# Patient Record
Sex: MALE | Race: WHITE | Employment: OTHER | URBAN - METROPOLITAN AREA
[De-identification: names, ages, dates, MRNs, and addresses within clinical notes are randomized per-mention and may not be internally consistent; named-entity substitution may affect disease eponyms.]

---

## 2017-08-31 DIAGNOSIS — I50.22 CHRONIC SYSTOLIC HEART FAILURE (H): Primary | ICD-10-CM

## 2017-08-31 LAB
ANION GAP SERPL CALCULATED.3IONS-SCNC: 6 MMOL/L (ref 3–14)
BUN SERPL-MCNC: 19 MG/DL (ref 7–30)
CALCIUM SERPL-MCNC: 8.6 MG/DL (ref 8.5–10.1)
CHLORIDE SERPL-SCNC: 105 MMOL/L (ref 94–109)
CO2 SERPL-SCNC: 28 MMOL/L (ref 20–32)
CREAT SERPL-MCNC: 0.84 MG/DL (ref 0.66–1.25)
GFR SERPL CREATININE-BSD FRML MDRD: 90 ML/MIN/1.7M2
GLUCOSE SERPL-MCNC: 96 MG/DL (ref 70–99)
POTASSIUM SERPL-SCNC: 4.4 MMOL/L (ref 3.4–5.3)
SODIUM SERPL-SCNC: 139 MMOL/L (ref 133–144)

## 2017-08-31 PROCEDURE — 80048 BASIC METABOLIC PNL TOTAL CA: CPT | Performed by: NURSE PRACTITIONER

## 2017-08-31 PROCEDURE — 36415 COLL VENOUS BLD VENIPUNCTURE: CPT | Performed by: NURSE PRACTITIONER

## 2017-09-19 ENCOUNTER — TRANSFERRED RECORDS (OUTPATIENT)
Dept: HEALTH INFORMATION MANAGEMENT | Facility: CLINIC | Age: 67
End: 2017-09-19

## 2020-02-23 ENCOUNTER — APPOINTMENT (OUTPATIENT)
Dept: GENERAL RADIOLOGY | Facility: CLINIC | Age: 70
DRG: 389 | End: 2020-02-23
Attending: PEDIATRICS
Payer: COMMERCIAL

## 2020-02-23 ENCOUNTER — HOSPITAL ENCOUNTER (INPATIENT)
Facility: CLINIC | Age: 70
LOS: 1 days | Discharge: HOME | DRG: 389 | End: 2020-02-24
Attending: FAMILY MEDICINE | Admitting: PEDIATRICS
Payer: COMMERCIAL

## 2020-02-23 ENCOUNTER — APPOINTMENT (OUTPATIENT)
Dept: GENERAL RADIOLOGY | Facility: CLINIC | Age: 70
DRG: 389 | End: 2020-02-23
Attending: FAMILY MEDICINE
Payer: COMMERCIAL

## 2020-02-23 ENCOUNTER — APPOINTMENT (OUTPATIENT)
Dept: CT IMAGING | Facility: CLINIC | Age: 70
DRG: 389 | End: 2020-02-23
Attending: FAMILY MEDICINE
Payer: COMMERCIAL

## 2020-02-23 DIAGNOSIS — R10.84 ABDOMINAL PAIN, GENERALIZED: ICD-10-CM

## 2020-02-23 DIAGNOSIS — K56.609 SMALL BOWEL OBSTRUCTION (H): ICD-10-CM

## 2020-02-23 PROBLEM — C91.10 CLL (CHRONIC LYMPHOCYTIC LEUKEMIA) (H): Status: ACTIVE | Noted: 2020-02-23

## 2020-02-23 PROBLEM — Z85.46 PERSONAL HISTORY OF MALIGNANT NEOPLASM OF PROSTATE: Status: ACTIVE | Noted: 2020-02-23

## 2020-02-23 PROBLEM — D72.828 NEUTROPHILIC LEUKOCYTOSIS: Status: ACTIVE | Noted: 2020-02-23

## 2020-02-23 PROBLEM — I42.8 NONISCHEMIC CARDIOMYOPATHY (H): Status: ACTIVE | Noted: 2020-02-23

## 2020-02-23 PROBLEM — R06.09 DYSPNEA ON EXERTION: Status: ACTIVE | Noted: 2020-02-23

## 2020-02-23 PROBLEM — K40.20 BILATERAL INGUINAL HERNIA: Status: ACTIVE | Noted: 2020-02-23

## 2020-02-23 PROBLEM — I51.89 DIASTOLIC DYSFUNCTION: Status: ACTIVE | Noted: 2020-02-23

## 2020-02-23 LAB
ALBUMIN SERPL-MCNC: 3.8 G/DL (ref 3.4–5)
ALP SERPL-CCNC: 88 U/L (ref 40–150)
ALT SERPL W P-5'-P-CCNC: 38 U/L (ref 0–70)
ANION GAP SERPL CALCULATED.3IONS-SCNC: 6 MMOL/L (ref 3–14)
AST SERPL W P-5'-P-CCNC: 22 U/L (ref 0–45)
BASOPHILS # BLD AUTO: 0.1 10E9/L (ref 0–0.2)
BASOPHILS NFR BLD AUTO: 0.3 %
BILIRUB SERPL-MCNC: 0.5 MG/DL (ref 0.2–1.3)
BUN SERPL-MCNC: 20 MG/DL (ref 7–30)
CALCIUM SERPL-MCNC: 8.9 MG/DL (ref 8.5–10.1)
CHLORIDE SERPL-SCNC: 103 MMOL/L (ref 94–109)
CO2 SERPL-SCNC: 30 MMOL/L (ref 20–32)
CREAT SERPL-MCNC: 0.77 MG/DL (ref 0.66–1.25)
DIFFERENTIAL METHOD BLD: ABNORMAL
EOSINOPHIL NFR BLD AUTO: 0 %
ERYTHROCYTE [DISTWIDTH] IN BLOOD BY AUTOMATED COUNT: 13 % (ref 10–15)
GFR SERPL CREATININE-BSD FRML MDRD: >90 ML/MIN/{1.73_M2}
GLUCOSE SERPL-MCNC: 144 MG/DL (ref 70–99)
HCT VFR BLD AUTO: 48.1 % (ref 40–53)
HGB BLD-MCNC: 16.3 G/DL (ref 13.3–17.7)
IMM GRANULOCYTES # BLD: 0.1 10E9/L (ref 0–0.4)
IMM GRANULOCYTES NFR BLD: 0.4 %
LIPASE SERPL-CCNC: 70 U/L (ref 73–393)
LYMPHOCYTES # BLD AUTO: 5.3 10E9/L (ref 0.8–5.3)
LYMPHOCYTES NFR BLD AUTO: 33.4 %
MCH RBC QN AUTO: 30.8 PG (ref 26.5–33)
MCHC RBC AUTO-ENTMCNC: 33.9 G/DL (ref 31.5–36.5)
MCV RBC AUTO: 91 FL (ref 78–100)
MONOCYTES # BLD AUTO: 0.4 10E9/L (ref 0–1.3)
MONOCYTES NFR BLD AUTO: 2.7 %
NEUTROPHILS # BLD AUTO: 10.1 10E9/L (ref 1.6–8.3)
NEUTROPHILS NFR BLD AUTO: 63.2 %
NRBC # BLD AUTO: 0 10*3/UL
NRBC BLD AUTO-RTO: 0 /100
PLATELET # BLD AUTO: 309 10E9/L (ref 150–450)
PLATELET # BLD EST: ABNORMAL 10*3/UL
POTASSIUM SERPL-SCNC: 4.2 MMOL/L (ref 3.4–5.3)
PROT SERPL-MCNC: 7 G/DL (ref 6.8–8.8)
RBC # BLD AUTO: 5.29 10E12/L (ref 4.4–5.9)
RBC MORPH BLD: NORMAL
SODIUM SERPL-SCNC: 139 MMOL/L (ref 133–144)
TROPONIN I SERPL-MCNC: <0.015 UG/L (ref 0–0.04)
WBC # BLD AUTO: 15.9 10E9/L (ref 4–11)

## 2020-02-23 PROCEDURE — 84484 ASSAY OF TROPONIN QUANT: CPT | Performed by: FAMILY MEDICINE

## 2020-02-23 PROCEDURE — 96374 THER/PROPH/DIAG INJ IV PUSH: CPT | Performed by: FAMILY MEDICINE

## 2020-02-23 PROCEDURE — 12000000 ZZH R&B MED SURG/OB

## 2020-02-23 PROCEDURE — 83690 ASSAY OF LIPASE: CPT | Performed by: FAMILY MEDICINE

## 2020-02-23 PROCEDURE — 74176 CT ABD & PELVIS W/O CONTRAST: CPT

## 2020-02-23 PROCEDURE — 25800030 ZZH RX IP 258 OP 636: Performed by: PEDIATRICS

## 2020-02-23 PROCEDURE — 25000128 H RX IP 250 OP 636

## 2020-02-23 PROCEDURE — 25000128 H RX IP 250 OP 636: Performed by: FAMILY MEDICINE

## 2020-02-23 PROCEDURE — 80053 COMPREHEN METABOLIC PANEL: CPT | Performed by: FAMILY MEDICINE

## 2020-02-23 PROCEDURE — 25000128 H RX IP 250 OP 636: Performed by: PEDIATRICS

## 2020-02-23 PROCEDURE — 96375 TX/PRO/DX INJ NEW DRUG ADDON: CPT | Performed by: FAMILY MEDICINE

## 2020-02-23 PROCEDURE — 96361 HYDRATE IV INFUSION ADD-ON: CPT | Performed by: FAMILY MEDICINE

## 2020-02-23 PROCEDURE — 85025 COMPLETE CBC W/AUTO DIFF WBC: CPT | Performed by: FAMILY MEDICINE

## 2020-02-23 PROCEDURE — 99285 EMERGENCY DEPT VISIT HI MDM: CPT | Mod: 25 | Performed by: FAMILY MEDICINE

## 2020-02-23 PROCEDURE — 40000986 XR CHEST PORT 1 VW

## 2020-02-23 PROCEDURE — 99223 1ST HOSP IP/OBS HIGH 75: CPT | Mod: AI | Performed by: PEDIATRICS

## 2020-02-23 PROCEDURE — 25000132 ZZH RX MED GY IP 250 OP 250 PS 637: Performed by: PEDIATRICS

## 2020-02-23 PROCEDURE — 25800030 ZZH RX IP 258 OP 636: Performed by: FAMILY MEDICINE

## 2020-02-23 PROCEDURE — 71101 X-RAY EXAM UNILAT RIBS/CHEST: CPT | Mod: TC,LT

## 2020-02-23 PROCEDURE — 99285 EMERGENCY DEPT VISIT HI MDM: CPT | Mod: Z6 | Performed by: FAMILY MEDICINE

## 2020-02-23 RX ORDER — CYCLOBENZAPRINE HCL 10 MG
10 TABLET ORAL 3 TIMES DAILY PRN
COMMUNITY

## 2020-02-23 RX ORDER — ASCORBIC ACID 500 MG
1000 TABLET ORAL DAILY
COMMUNITY

## 2020-02-23 RX ORDER — ONDANSETRON 2 MG/ML
INJECTION INTRAMUSCULAR; INTRAVENOUS
Status: COMPLETED
Start: 2020-02-23 | End: 2020-02-23

## 2020-02-23 RX ORDER — LORAZEPAM 0.5 MG/1
0.5 TABLET ORAL EVERY 4 HOURS PRN
Status: DISCONTINUED | OUTPATIENT
Start: 2020-02-23 | End: 2020-02-24 | Stop reason: HOSPADM

## 2020-02-23 RX ORDER — HYDROMORPHONE HYDROCHLORIDE 1 MG/ML
.3-.5 INJECTION, SOLUTION INTRAMUSCULAR; INTRAVENOUS; SUBCUTANEOUS
Status: DISCONTINUED | OUTPATIENT
Start: 2020-02-23 | End: 2020-02-24 | Stop reason: HOSPADM

## 2020-02-23 RX ORDER — HYDROMORPHONE HYDROCHLORIDE 1 MG/ML
0.5 INJECTION, SOLUTION INTRAMUSCULAR; INTRAVENOUS; SUBCUTANEOUS
Status: COMPLETED | OUTPATIENT
Start: 2020-02-23 | End: 2020-02-23

## 2020-02-23 RX ORDER — SODIUM CHLORIDE, SODIUM LACTATE, POTASSIUM CHLORIDE, CALCIUM CHLORIDE 600; 310; 30; 20 MG/100ML; MG/100ML; MG/100ML; MG/100ML
1000 INJECTION, SOLUTION INTRAVENOUS CONTINUOUS
Status: DISCONTINUED | OUTPATIENT
Start: 2020-02-23 | End: 2020-02-24

## 2020-02-23 RX ORDER — LORAZEPAM 2 MG/ML
0.5 INJECTION INTRAMUSCULAR EVERY 4 HOURS PRN
Status: DISCONTINUED | OUTPATIENT
Start: 2020-02-23 | End: 2020-02-24 | Stop reason: HOSPADM

## 2020-02-23 RX ORDER — PROCHLORPERAZINE MALEATE 5 MG
5 TABLET ORAL EVERY 6 HOURS PRN
Status: DISCONTINUED | OUTPATIENT
Start: 2020-02-23 | End: 2020-02-24 | Stop reason: HOSPADM

## 2020-02-23 RX ORDER — ACETAMINOPHEN 650 MG/1
650 SUPPOSITORY RECTAL EVERY 4 HOURS PRN
Status: DISCONTINUED | OUTPATIENT
Start: 2020-02-23 | End: 2020-02-24

## 2020-02-23 RX ORDER — ONDANSETRON 2 MG/ML
4 INJECTION INTRAMUSCULAR; INTRAVENOUS EVERY 6 HOURS PRN
Status: DISCONTINUED | OUTPATIENT
Start: 2020-02-23 | End: 2020-02-24 | Stop reason: HOSPADM

## 2020-02-23 RX ORDER — OMEGA-3/DHA/EPA/FISH OIL 200-300 MG
1000 CAPSULE ORAL DAILY
COMMUNITY

## 2020-02-23 RX ORDER — POLYETHYLENE GLYCOL 3350 17 G/17G
17 POWDER, FOR SOLUTION ORAL DAILY PRN
COMMUNITY

## 2020-02-23 RX ORDER — NALOXONE HYDROCHLORIDE 0.4 MG/ML
.1-.4 INJECTION, SOLUTION INTRAMUSCULAR; INTRAVENOUS; SUBCUTANEOUS
Status: DISCONTINUED | OUTPATIENT
Start: 2020-02-23 | End: 2020-02-24 | Stop reason: HOSPADM

## 2020-02-23 RX ORDER — PROCHLORPERAZINE 25 MG
12.5 SUPPOSITORY, RECTAL RECTAL EVERY 12 HOURS PRN
Status: DISCONTINUED | OUTPATIENT
Start: 2020-02-23 | End: 2020-02-24 | Stop reason: HOSPADM

## 2020-02-23 RX ORDER — ACETAMINOPHEN 325 MG/1
325-650 TABLET ORAL EVERY 6 HOURS PRN
COMMUNITY

## 2020-02-23 RX ORDER — ONDANSETRON 4 MG/1
4 TABLET, ORALLY DISINTEGRATING ORAL EVERY 6 HOURS PRN
Status: DISCONTINUED | OUTPATIENT
Start: 2020-02-23 | End: 2020-02-24 | Stop reason: HOSPADM

## 2020-02-23 RX ORDER — GABAPENTIN 100 MG/1
100 CAPSULE ORAL 3 TIMES DAILY PRN
COMMUNITY
End: 2020-08-07

## 2020-02-23 RX ORDER — ONDANSETRON 2 MG/ML
4 INJECTION INTRAMUSCULAR; INTRAVENOUS ONCE
Status: DISCONTINUED | OUTPATIENT
Start: 2020-02-23 | End: 2020-02-23

## 2020-02-23 RX ORDER — LIDOCAINE 40 MG/G
CREAM TOPICAL
Status: DISCONTINUED | OUTPATIENT
Start: 2020-02-23 | End: 2020-02-24 | Stop reason: HOSPADM

## 2020-02-23 RX ADMIN — Medication 1 LOZENGE: at 18:59

## 2020-02-23 RX ADMIN — HYDROMORPHONE HYDROCHLORIDE 0.5 MG: 1 INJECTION, SOLUTION INTRAMUSCULAR; INTRAVENOUS; SUBCUTANEOUS at 06:13

## 2020-02-23 RX ADMIN — HYDROMORPHONE HYDROCHLORIDE 0.5 MG: 1 INJECTION, SOLUTION INTRAMUSCULAR; INTRAVENOUS; SUBCUTANEOUS at 06:31

## 2020-02-23 RX ADMIN — Medication 1 LOZENGE: at 15:30

## 2020-02-23 RX ADMIN — SODIUM CHLORIDE, POTASSIUM CHLORIDE, SODIUM LACTATE AND CALCIUM CHLORIDE 1000 ML: 600; 310; 30; 20 INJECTION, SOLUTION INTRAVENOUS at 11:23

## 2020-02-23 RX ADMIN — Medication 1 LOZENGE: at 12:04

## 2020-02-23 RX ADMIN — ONDANSETRON 4 MG: 2 INJECTION INTRAMUSCULAR; INTRAVENOUS at 06:12

## 2020-02-23 RX ADMIN — Medication 1 LOZENGE: at 20:46

## 2020-02-23 RX ADMIN — ONDANSETRON 4 MG: 2 INJECTION INTRAMUSCULAR; INTRAVENOUS at 10:40

## 2020-02-23 RX ADMIN — HYDROMORPHONE HYDROCHLORIDE 0.5 MG: 1 INJECTION, SOLUTION INTRAMUSCULAR; INTRAVENOUS; SUBCUTANEOUS at 10:27

## 2020-02-23 RX ADMIN — SODIUM CHLORIDE 1000 ML: 9 INJECTION, SOLUTION INTRAVENOUS at 06:12

## 2020-02-23 RX ADMIN — SODIUM CHLORIDE, POTASSIUM CHLORIDE, SODIUM LACTATE AND CALCIUM CHLORIDE 1000 ML: 600; 310; 30; 20 INJECTION, SOLUTION INTRAVENOUS at 20:46

## 2020-02-23 ASSESSMENT — ACTIVITIES OF DAILY LIVING (ADL)
ADLS_ACUITY_SCORE: 11
RETIRED_EATING: 0-->INDEPENDENT
TRANSFERRING: 0-->INDEPENDENT
AMBULATION: 0-->INDEPENDENT
FALL_HISTORY_WITHIN_LAST_SIX_MONTHS: NO
BATHING: 0-->INDEPENDENT
TOILETING: 0-->INDEPENDENT
COGNITION: 0 - NO COGNITION ISSUES REPORTED
SWALLOWING: 0-->SWALLOWS FOODS/LIQUIDS WITHOUT DIFFICULTY
ADLS_ACUITY_SCORE: 13
ADLS_ACUITY_SCORE: 11
RETIRED_COMMUNICATION: 0-->UNDERSTANDS/COMMUNICATES WITHOUT DIFFICULTY
DRESS: 0-->INDEPENDENT

## 2020-02-23 ASSESSMENT — MIFFLIN-ST. JEOR: SCORE: 1815.4

## 2020-02-23 NOTE — PROGRESS NOTES
S-(situation): Patient arrives to room 268 via cart from ED    B-(background): SBO    A-(assessment): VSS. IV infusing TKO, awaiting for fluid orders. NG clamped awaiting orders. Pt up with SBA, will continue to monitor.     R-(recommendations): Orders reviewed with pt. Will monitor patient per MD orders.     Inpatient nursing criteria listed below were met:    Health care directives status obtained and documented: Yes  SCD's Documented: N/A  Skin issues/needs documented:Yes  Isolation needs addressed, if appropriate: NA  Fall Prevention: Care plan updated, Education given and documented Yes  MRSA swab completed for ICU admissions only: NA  Care Plan initiated: Yes  Education Assessment documented:Yes  Education Documented (Pre-existing chronic infection such as, MRSA/VRE need education on admission): Yes  Admission Medication Reconciliation completed: Yes  New medication patient education completed and documented (Possible Side Effects of Common Medications handout): Yes  Home medications if not able to send immediately home with family stored here: NA  Reminder note placed in discharge instructions: NA  Discharge planning review completed (admission navigator) Yes

## 2020-02-23 NOTE — H&P
[facility]    History and Physical - Hospitalist Service       Date of Admission:  2/23/2020    Assessment & Plan   Sean Palmer is a 70 year old male admitted on 2/23/2020. He presents with symptoms, signs, and test results consistent with small bowel obstruction probably in the jejunum based on radiographic findings.  He is at high risk for an adverse outcome including worsening bowel obstruction, perforation, bowel ischemia, and death, so hospitalization is considered medically necessary.  Based on the presently available information, hospitalization for at least 2 midnights is anticipated.        Principal Problem:    Small bowel obstruction (H)  Assessment: previous history of abdominal pelvic surgery could be associated with adhesions causing mechanical bowel obstruction, he also has CLL and focal lymphocyte collection in the bowel wall could serve as a nidus for precipitating small bowel obstruction, he is clinically improved after initiation of nasogastric decompression in the ER  Plan: Admit to inpatient, keep n.p.o., continue nasogastric decompression, treat symptomatically with IV narcotics and antiemetics, consider surgical consultation depending upon clinical course    Active Problems:    Chest wall pain  Assessment: Chronic with focal tenderness over the ribs in the left anterior axillary region raising question for possible focal rib lesion in this patient with known history of prostate cancer and CLL  Plan: Rib x-rays, consider additional radiographic imaging depending upon those results      CLL (chronic lymphocytic leukemia) (H)-no specific therapy  Assessment: Chronic and stable by patient report without active treatment  Plan: No acute intervention      Nonischemic cardiomyopathy (H)-EF 40-45% Nov 2019 Echo  Assessment: Chronic and clinically stable, puts him at risk for acute heart failure with aggressive IV fluid therapy  Plan: Start maintenance IV fluids,  monitor intake and output along with daily weights, may need to adjust IV fluid rate and/or add a diuretic if there is increasing concern for symptoms or signs of worsening heart failure      Neutrophilic leukocytosis  Assessment: Leukocytosis presently with neutrophil predominance probably a physiologic response to stress of his current illness, no other signs of systemic inflammatory response syndrome currently  Plan: Monitor clinically and follow WBC with differential      Dyspnea on exertion  Assessment: Chronic symptom slowly progressive over time, cause unclear  Plan: Monitor clinically, could check oxygen saturations with activity      Personal history of malignant neoplasm of prostate-s/p surgery  Assessment: Underwent surgical treatment and patient says he has not had any other adjunctive therapies for prostate cancer, patient reports that his PSA has remained undetectable since then  Plan: No acute interventions      Bilateral inguinal hernia-containing fat  Assessment: Incidental finding on CT, not symptomatic and not contributing to bowel obstruction  Plan: Follow clinically       Diastolic dysfunction grade I by Echo Nov 2019  Assessment: incidental finding on previous echocardiogram, also increases his risk for acute heart failure  Plan: Monitor as above       Diet: NPO for Medical/Clinical Reasons Except for: Meds    DVT Prophylaxis: Low Risk/Ambulatory with no VTE prophylaxis indicated  Lobato Catheter: not present  Code Status: Full resuscitation as discussed with the patient today    Disposition Plan   Expected discharge: 4 - 7 days, recommended to prior living arrangement once Bowel obstruction has resolved and he is tolerating adequate oral intake.  Entered: Elmer Lofton MD 02/23/2020, 11:28 AM     The patient's care was discussed with the Patient.    Elmer Lofton MD  [facility]  [place]    ______________________________________________________________________    Chief Complaint   Abdominal pain    History is obtained from the patient, electronic health record and emergency department physician    History of Present Illness   Sean Palmer is a 70 year old male who was in his usual health until yesterday.  Retrospectively, he now thinks that he may not have passed any flatus yesterday during the day after the morning.  He had a normal appetite last evening and ate a fairly large supper which is normal for him.  Within an hour after eating supper, he noted progressively worsening abdominal bloating and distention although did not have severe pain initially.  These symptoms gradually worsened and he had some acid reflux and regurgitation.  He tried taking Gas-X and Mylanta which did not help.  He used a glycerin suppository after which he passed flatus and some bowel movement and temporarily felt less bloated.  He went to sleep but awoke in the middle the night with severe abdominal pain that did not relent.  His wife administered an enema early this morning from which she had no results in which did not help relieve his abdominal pain.  They therefore presented to the ER for evaluation.  In the ER, he was treated with IV narcotics and IV fluids.  Because of suspected bowel obstruction, a nasogastric tube was placed and he was admitted.  He has now seen in his hospital room for evaluation.  He says he presently feels much better than he had earlier this morning.    Review of Systems    CONSTITUTIONAL:  positive for feeling cold and chilled overnight,  negative for  fevers and sweats  EYES:  negative for  eye discharge and visual disturbance  HEENT:  negative for  nasal congestion and sore throat  RESPIRATORY:  negative for  dry cough, cough with sputum and dyspnea at rest   CARDIOVASCULAR:  positive for chest pain in his left upper chest radiating to his left shoulder and to his left scapular  area with numbness from the left shoulder to the left elbow, pain has been constant for 3 months although sometimes is more severe, numbness comes and goes, symptoms do not seem to be worse during physical activity including his exercise classes although he does notice worsening pain in that area about an hour after he finishes his exercise class, pain has progressively gradually worsened over the last 3 months, also has noticed worsening exertional dyspnea and exercise tolerance in the last 3 months such that he now has to stop and rest with walking longer distances whereas he did not have to do that previously  negative for  palpitations, edema, syncope  GASTROINTESTINAL:  negative for recent change in bowel habits, hematemesis, hemtochezia and melena  GENITOURINARY:  negative for frequency, dysuria and hematuria  INTEGUMENT/BREAST:  negative for rash  HEMATOLOGIC/LYMPHATIC:  negative for bleeding  ALLERGIC/IMMUNOLOGIC:  negative for drug reactions  MUSCULOSKELETAL:  positive for chronic stiffness in his limbs that has not changed recently,  negative for  arthralgias and joint swelling  NEUROLOGICAL:  negative for headaches, dizziness, coordination problems, gait problems and weakness in his left hand    Past Medical History    I have reviewed this patient's medical history and updated it with pertinent information if needed.   Past Medical History:   Diagnosis Date     Depressive disorder, not elsewhere classified      Other and unspecified disc disorder of unspecified region      Prostatitis, unspecified      He says [facility] 2 [facility] for which he underwent prostatectomy but no associated radiation or hormonal or chemotherapy.  He reports that his repeat PSA has been undetectable since then.  He says his prostate cancer was attributed to agent orange exposure.    He also reports that he has been diagnosed with CLL also [facility] and  that this also has been attributed to agent orange exposure.  So far, he has not had any specific treatment for CLL.  He is not aware of any previous history of complications from CLL.    Past Surgical History   I have reviewed this patient's surgical history and updated it with pertinent information if needed.  Past Surgical History:   Procedure Laterality Date     C APPENDECTOMY       COLONOSCOPY  10/14/10     DISKECTOMY, LUMBAR, SINGLE SP      L5/S1 discectomy     HC COLONOSCOPY W/WO BRUSH/WASH  10/26/2004     HC CYSTOURETHROSCOPY       HC REPAIR ING HERNIA,5+Y/O,REDUCIBL       HC UGI ENDOSCOPY, SIMPLE EXAM  2005    Polypectomy, biopsies.     HC VASECTOMY UNILAT/BILAT W POSTOP SEMEN      Vasectomy       Social History   I have reviewed this patient's social history and updated it with pertinent information if needed.  Social History     Tobacco Use     Smoking status: Never Smoker   Substance Use Topics     Alcohol use: No     Drug use: No       Family History   I have reviewed this patient's family history and updated it with pertinent information if needed.   Family History   Problem Relation Age of Onset     Heart Disease Father         Father  of MI.     Hypertension Mother      Heart Disease Mother         Angina pectoris.     Thyroid Disease Mother         Goiter     Respiratory Mother      Cerebrovascular Disease Sister      Hypertension Sister      C.A.D. Brother        Prior to Admission Medications   Prior to Admission Medications   Prescriptions Last Dose Informant Patient Reported? Taking?   Omega-3 Fatty Acids (ULTRA OMEGA 3) 1000 MG CAPS  at 0700  Yes No   Sig: Take 1,000 mg by mouth   acetaminophen (TYLENOL) 325 MG tablet 2020 at 1000  Yes Yes   Sig: Take 325-650 mg by mouth every 6 hours as needed for mild pain Took 2 650 yesterday.   ascorbic acid 500 MG TABS 2020 at 0700  Yes Yes   Sig: Take 1,000 mg by mouth   cholecalciferol (VITAMIN D-1000 MAX ST) 25 MCG (1000 UT) TABS  "2/22/2020 at 0700  Yes Yes   Sig: Take 1,000 Units by mouth   cyclobenzaprine (FLEXERIL) 10 MG tablet Past Week at 0900  Yes Yes   Sig: Take 10 mg by mouth   gabapentin (NEURONTIN) 100 MG capsule Past Week at Unknown time  Yes Yes   Sig: Take 100 mg by mouth   ibuprofen (ADVIL,MOTRIN) 200 MG tablet Past Month at Unknown time  No Yes   Sig: take 3 Tabs by mouth 4 times daily as needed for pain.   polyethylene glycol (MIRALAX) packet 2/23/2020 at 0300  Yes Yes   Sig: Take 17 g by mouth      Facility-Administered Medications: None     Allergies   Allergies   Allergen Reactions     No Known Drug Allergies        Physical Exam   Vital Signs: Temp: 97.4  F (36.3  C) Temp src: Oral BP: (!) 153/92 Pulse: 85   Resp: 16 SpO2: 96 % O2 Device: None (Room air)    Weight: 216 lbs 0 oz     Body mass index is 27 kg/m .    Patient Vitals for the past 24 hrs:   BP Temp Temp src Pulse Resp SpO2 Height Weight   02/23/20 0921 (!) 153/92 97.4  F (36.3  C) Oral 85 16 96 % 1.905 m (6' 3\") 98 kg (216 lb)   02/23/20 0906 (!) 142/89 -- -- 89 18 98 % -- --   02/23/20 0740 (!) 148/90 -- -- 90 -- -- -- --   02/23/20 0643 (!) 146/105 -- -- 80 20 94 % -- --   02/23/20 0550 (!) 143/91 97.3  F (36.3  C) Temporal 85 20 99 % -- --     General Appearance: Ill-appearing, nasogastric tube present draining yellow-green bilious drainage  Eyes: Anicteric sclerae, clear conjunctivae  HEENT: Normal oropharynx aside from NG tube present along the posterior wall and somewhat dry lips and mucous membranes  Neck: Trachea midline, symmetric, nontender  Chest: No gross anomalies, no visible asymmetry, no focal tenderness over the left anterior chest but he does have reproducible focal tenderness in the left lateral chest wall in the anterior axillary line just below the axilla with tenderness that seems to be located over 1-2 ribs in that area, no bony step-off or mass palpable  Respiratory: Normal respiratory effort, clear lungs  Cardiovascular: Regular rate and " rhythm, no gallop or murmur, symmetric radial pulses, brisk capillary refill  GI: Well-healed midline vertical scar between the umbilicus and the pubis, very hypoactive bowel sounds, nondistended abdomen, soft, mild abdominal tenderness generalized  Lymph/Hematologic: No cervical or supraclavicular lymphadenopathy  Genitourinary: No obvious inguinal scars, somewhat indistinct bulging in the inguinal region bilaterally without tenderness  Skin: Normal color, no rash  Musculoskeletal: No tenderness over the left clavicle, acromion, or other shoulder area, normal painless passive range of motion of the left shoulder  Neurologic: Alert and oriented, normal mental status, moves all limbs purposefully and symmetrically, no involuntary movements  Psychiatric: Normal mood and affect    Data   Data reviewed today: I reviewed all medications, new labs and imaging results over the last 24 hours. I personally reviewed the Initial chest x-ray image(s) showing Nasogastric tube coursing towards the stomach but neither the tip of the NG tube nor the complete stomach is visible on this film.    Recent Labs   Lab 02/23/20  0557   WBC 15.9*   HGB 16.3   MCV 91         POTASSIUM 4.2   CHLORIDE 103   CO2 30   BUN 20   CR 0.77   ANIONGAP 6   OLIVIA 8.9   *   ALBUMIN 3.8   PROTTOTAL 7.0   BILITOTAL 0.5   ALKPHOS 88   ALT 38   AST 22   LIPASE 70*   TROPI <0.015     Recent Results (from the past 24 hour(s))   CT Abdomen Pelvis w/o Contrast    Narrative    EXAM: CT ABDOMEN AND PELVIS WITHOUT CONTRAST  LOCATION: [facility]  DATE/TIME: 02/23/2020, 6:40 AM    INDICATION: Diffuse abdominal pain. Abdominal distention. No flatus.  COMPARISON: None.    TECHNIQUE: CT scan of the abdomen and pelvis was performed without IV contrast. Multiplanar reformats were obtained. Dose reduction techniques were used.  CONTRAST: 10/06/2010.    FINDINGS:    LOWER CHEST: Tiny calcified granuloma in the left lung  base.    HEPATOBILIARY: The gallbladder is mildly distended, but otherwise unremarkable.    SPLEEN: Unremarkable.    PANCREAS: Unremarkable.    ADRENAL GLANDS: Unremarkable.    KIDNEYS/BLADDER: No suspicious renal lesions.    BOWEL: The stomach is moderately distended with fluid. There is a moderately long segment of mildly dilated fluid-filled proximal jejunum. The more distal small bowel and colon are relatively decompressed. The appendix is not visualized.    LYMPH NODES: Unremarkable.    VASCULATURE: Atherosclerotic calcification in the abdominal aorta.    OTHER: Small to moderate-sized right inguinal hernia containing fat and tiny left inguinal hernia containing fat.      Impression    IMPRESSION: Proximal small bowel obstruction: The stomach and proximal jejunum are fluid-filled and mildly dilated and the more distal bowel is relatively decompressed.      XR Chest Port 1 View    Narrative    CHEST PORTABLE ONE VIEW 2/23/2020 7:50 AM     HISTORY: NG placement.     COMPARISON: September 9, 2010 chest radiograph.    FINDINGS: Normal heart and lungs. Slight tortuosity and ectasia of the  thoracic aorta redemonstrated. NG tube is present with a portion of  the tube overlapping the medial stomach. The tip is not included on  this radiograph.      Impression    IMPRESSION: No acute cardiopulmonary abnormalities.     ASHELY CARDOZO MD

## 2020-02-23 NOTE — ED PROVIDER NOTES
[facility] ED Provider Note   Patient: Sean Palmer  MRN #:  [id number]  Date of Visit: February 23, 2020    CC:     Chief Complaint   Patient presents with     Abdominal Pain     HPI:  Sean Palmer is a 70 year old male who presented to the emergency department with acute onset of abdominal pain that started at around 7 PM last night.  Patient had lasagna for dinner and had an extra piece.  Immediately afterwards he felt bloated and regretted eating an extra piece of lasagna.  Patient states that the pain has persisted and he is diffusely distended and bloated.  He thought he was constipated and use suppositories and a Fleet enema.  He had a little bit of results with the suppository but no improvement with the enema.  He has not passed gas since yesterday morning, and threw up twice with yellow-colored stomach content.  Patient has a pain level of 7/10 and describes the pain is crampy. He has not had any fevers but felt slightly chilled.   He is accompanied by his wife and reported that he had not slept all night.  Patient had previous surgery for appendicitis, inguinal hernia repair as a child, and vasectomy.  He still has his gallbladder.  He has never had diverticulitis, bowel obstruction, or gallbladder disease.  [facility], was diagnosed with nonischemic cardiomyopathy, but recently his ejection fraction had improved to over 40%.  He is not on any current medications.  He denies any tobacco or alcohol intake.    Problem List:  Patient Active Problem List    Diagnosis Date Noted     CARDIOVASCULAR SCREENING; LDL GOAL LESS THAN 160 10/31/2010     Priority: Medium     Difficulty urinating 10/30/2008     Priority: Medium     Degeneration of lumbar or lumbosacral intervertebral disc 04/29/2005     Priority: Medium     Depressive disorder, not elsewhere classified 04/22/2005     Priority: Medium     Malaise and fatigue 11/26/2004      Priority: Medium     Problem list name updated by automated process. Provider to review       Abdominal pain, left lower quadrant 11/26/2004     Priority: Medium     Chest pain 11/26/2004     Priority: Medium     Problem list name updated by automated process. Provider to review         Past Medical History:   Diagnosis Date     Depressive disorder, not elsewhere classified      Other and unspecified disc disorder of unspecified region      Prostatitis, unspecified        MEDS: acetaminophen (TYLENOL) 325 MG tablet  ascorbic acid 500 MG TABS  cholecalciferol (VITAMIN D-1000 MAX ST) 25 MCG (1000 UT) TABS  cyclobenzaprine (FLEXERIL) 10 MG tablet  gabapentin (NEURONTIN) 100 MG capsule  ibuprofen (ADVIL,MOTRIN) 200 MG tablet  polyethylene glycol (MIRALAX) packet  Omega-3 Fatty Acids (ULTRA OMEGA 3) 1000 MG CAPS        ALLERGIES:    Allergies   Allergen Reactions     No Known Drug Allergies        Past Surgical History:   Procedure Laterality Date     C APPENDECTOMY       COLONOSCOPY  10/14/10     DISKECTOMY, LUMBAR, SINGLE SP      L5/S1 discectomy     HC COLONOSCOPY W/WO BRUSH/WASH  10/26/2004     HC CYSTOURETHROSCOPY       HC REPAIR ING HERNIA,5+Y/O,REDUCIBL       HC UGI ENDOSCOPY, SIMPLE EXAM  02/28/2005    Polypectomy, biopsies.     HC VASECTOMY UNILAT/BILAT W POSTOP SEMEN      Vasectomy       Social History     Tobacco Use     Smoking status: Never Smoker   Substance Use Topics     Alcohol use: No     Drug use: No         Review of Systems   Except as noted in HPI, all other systems were reviewed and are negative    Physical Exam     Vitals were reviewed  Patient Vitals for the past 12 hrs:   BP Temp Temp src Pulse Resp SpO2   02/23/20 0643 (!) 146/105 -- -- 80 20 94 %   02/23/20 0550 (!) 143/91 97.3  F (36.3  C) Temporal 85 20 99 %     GENERAL APPEARANCE: Alert, moderate distress due to pain  FACE: normal facies  EYES: Pupils are equal; sclerae nonicteric  HENT: normal external exam  NECK: no adenopathy or  asymmetry  RESP: normal respiratory effort; clear breath sounds bilaterally  CV: regular rate and rhythm; no significant murmurs, gallops or rubs  ABD: soft, distended abdomen with diffuse abdominal tenderness; no rebound or guarding; bowel sounds are absent  MS: no gross deformities noted; normal muscle tone.  EXT: No calf tenderness or pitting edema  SKIN: no worrisome rash  NEURO: no facial droop; no focal deficits, speech is normal  PSYCH: normal mood and affect      Available Lab/Imaging Results     Results for orders placed or performed during the hospital encounter of 02/23/20 (from the past 24 hour(s))   CBC with platelets differential   Result Value Ref Range    WBC 15.9 (H) 4.0 - 11.0 10e9/L    RBC Count 5.29 4.4 - 5.9 10e12/L    Hemoglobin 16.3 13.3 - 17.7 g/dL    Hematocrit 48.1 40.0 - 53.0 %    MCV 91 78 - 100 fl    MCH 30.8 26.5 - 33.0 pg    MCHC 33.9 31.5 - 36.5 g/dL    RDW 13.0 10.0 - 15.0 %    Platelet Count 309 150 - 450 10e9/L    Diff Method Automated Method     % Neutrophils 63.2 %    % Lymphocytes 33.4 %    % Monocytes 2.7 %    % Eosinophils 0.0 %    % Basophils 0.3 %    % Immature Granulocytes 0.4 %    Nucleated RBCs 0 0 /100    Absolute Neutrophil 10.1 (H) 1.6 - 8.3 10e9/L    Absolute Lymphocytes 5.3 0.8 - 5.3 10e9/L    Absolute Monocytes 0.4 0.0 - 1.3 10e9/L    Absolute Basophils 0.1 0.0 - 0.2 10e9/L    Abs Immature Granulocytes 0.1 0 - 0.4 10e9/L    Absolute Nucleated RBC 0.0     RBC Morphology Normal     Platelet Estimate       Automated count confirmed.  Platelet morphology is normal.   Comprehensive metabolic panel   Result Value Ref Range    Sodium 139 133 - 144 mmol/L    Potassium 4.2 3.4 - 5.3 mmol/L    Chloride 103 94 - 109 mmol/L    Carbon Dioxide 30 20 - 32 mmol/L    Anion Gap 6 3 - 14 mmol/L    Glucose 144 (H) 70 - 99 mg/dL    Urea Nitrogen 20 7 - 30 mg/dL    Creatinine 0.77 0.66 - 1.25 mg/dL    GFR Estimate >90 >60 mL/min/[1.73_m2]    GFR Estimate If Black >90 >60 mL/min/[1.73_m2]     Calcium 8.9 8.5 - 10.1 mg/dL    Bilirubin Total 0.5 0.2 - 1.3 mg/dL    Albumin 3.8 3.4 - 5.0 g/dL    Protein Total 7.0 6.8 - 8.8 g/dL    Alkaline Phosphatase 88 40 - 150 U/L    ALT 38 0 - 70 U/L    AST 22 0 - 45 U/L   Lipase   Result Value Ref Range    Lipase 70 (L) 73 - 393 U/L   Troponin I   Result Value Ref Range    Troponin I ES <0.015 0.000 - 0.045 ug/L   CT Abdomen Pelvis w/o Contrast    Narrative    EXAM: CT ABDOMEN AND PELVIS WITHOUT CONTRAST  LOCATION: [facility]  DATE/TIME: 02/23/2020, 6:40 AM    INDICATION: Diffuse abdominal pain. Abdominal distention. No flatus.  COMPARISON: None.    TECHNIQUE: CT scan of the abdomen and pelvis was performed without IV contrast. Multiplanar reformats were obtained. Dose reduction techniques were used.  CONTRAST: 10/06/2010.    FINDINGS:    LOWER CHEST: Tiny calcified granuloma in the left lung base.    HEPATOBILIARY: The gallbladder is mildly distended, but otherwise unremarkable.    SPLEEN: Unremarkable.    PANCREAS: Unremarkable.    ADRENAL GLANDS: Unremarkable.    KIDNEYS/BLADDER: No suspicious renal lesions.    BOWEL: The stomach is moderately distended with fluid. There is a moderately long segment of mildly dilated fluid-filled proximal jejunum. The more distal small bowel and colon are relatively decompressed. The appendix is not visualized.    LYMPH NODES: Unremarkable.    VASCULATURE: Atherosclerotic calcification in the abdominal aorta.    OTHER: Small to moderate-sized right inguinal hernia containing fat and tiny left inguinal hernia containing fat.      Impression    IMPRESSION: Proximal small bowel obstruction: The stomach and proximal jejunum are fluid-filled and mildly dilated and the more distal bowel is relatively decompressed.               Impression     Final diagnoses:   Abdominal pain, generalized   Small bowel obstruction (H)         ED Course & Medical Decision Making   Sean Palmer is a 70 year old male who presented to the  emergency department with acute diffuse abdominal pain that started at 7 PM Saturday evening after eating 2 pieces of lasagna for dinner.  His pain has persisted and he describes a crampy pain rated 7/10.  He has thrown up twice with yellow stomach contents, and did suppositories with some results but has not had any flatus since Saturday morning.  Vital signs reveal a temp of 97.3, blood pressure 143/91, heart rate of 85, respiratory rate of 20 with 99% oxygen saturation.  Exam reveals distended and protuberant abdomen with markedly diminished versus absent bowel sounds.  A peripheral IV was established, and a work-up initiated including a CBC, comprehensive metabolic panel, lipase, and troponin.  Patient described having some increased shortness of breath.  A CT scan of the abdomen was ordered.  Patient received IV fluids, Zofran for nausea, and Dilaudid for pain.  Patient's work-up reveals an elevated white blood count of 15.9, hemoglobin of 16.3, normal platelet count.  Patient has normal differential.  Comprehensive metabolic panel is normal except for a nonfasting glucose of 144.  Lipase is 70, troponin less than 0.015.  CT scan of the abdomen reveals a proximal small bowel obstruction.  The stomach and proximal jejunum are fluid-filled and mildly dilated and the more distal bowel is relatively decompressed.  A nasogastric tube will be inserted and the patient will be admitted to the hospitalist service with possible surgery consultation.    7:24 AM: Discussed the patient with Dr. Lofton, our hospitalist he has accepted the patient onto his service.  Patient has an expected stay of greater than 2 days.  I agree with him that we do not need a general consult at this time.  Transition orders were written.  Patient and his wife were updated and are in agreement with this plan.  Pain has improved with Dilaudid.  We will insert a nasogastric tube very shortly.        Disclaimer: This note consists of words and  symbols derived from keyboarding and dictation using voice recognition software.  As a result, there may be errors that have gone undetected.  Please consider this when interpreting information found in this note.       Phu Del Real MD  02/23/20 0703

## 2020-02-24 VITALS
RESPIRATION RATE: 18 BRPM | TEMPERATURE: 96.1 F | DIASTOLIC BLOOD PRESSURE: 81 MMHG | SYSTOLIC BLOOD PRESSURE: 139 MMHG | OXYGEN SATURATION: 97 % | BODY MASS INDEX: 25.74 KG/M2 | HEIGHT: 75 IN | HEART RATE: 85 BPM | WEIGHT: 207 LBS

## 2020-02-24 LAB
ANION GAP SERPL CALCULATED.3IONS-SCNC: 5 MMOL/L (ref 3–14)
BASOPHILS # BLD AUTO: 0.1 10E9/L (ref 0–0.2)
BASOPHILS NFR BLD AUTO: 0.3 %
BUN SERPL-MCNC: 25 MG/DL (ref 7–30)
CALCIUM SERPL-MCNC: 8.2 MG/DL (ref 8.5–10.1)
CHLORIDE SERPL-SCNC: 111 MMOL/L (ref 94–109)
CO2 SERPL-SCNC: 27 MMOL/L (ref 20–32)
CREAT SERPL-MCNC: 0.67 MG/DL (ref 0.66–1.25)
DIFFERENTIAL METHOD BLD: ABNORMAL
EOSINOPHIL NFR BLD AUTO: 0.6 %
GFR SERPL CREATININE-BSD FRML MDRD: >90 ML/MIN/{1.73_M2}
GLUCOSE SERPL-MCNC: 120 MG/DL (ref 70–99)
IMM GRANULOCYTES # BLD: 0.1 10E9/L (ref 0–0.4)
IMM GRANULOCYTES NFR BLD: 0.3 %
LYMPHOCYTES # BLD AUTO: 5.1 10E9/L (ref 0.8–5.3)
LYMPHOCYTES NFR BLD AUTO: 23.6 %
MONOCYTES # BLD AUTO: 1.6 10E9/L (ref 0–1.3)
MONOCYTES NFR BLD AUTO: 7.2 %
NEUTROPHILS # BLD AUTO: 14.7 10E9/L (ref 1.6–8.3)
NEUTROPHILS NFR BLD AUTO: 68 %
NRBC # BLD AUTO: 0 10*3/UL
NRBC BLD AUTO-RTO: 0 /100
PLATELET # BLD EST: ABNORMAL 10*3/UL
POTASSIUM SERPL-SCNC: 4.1 MMOL/L (ref 3.4–5.3)
RBC MORPH BLD: NORMAL
SODIUM SERPL-SCNC: 143 MMOL/L (ref 133–144)
WBC # BLD AUTO: 21.6 10E9/L (ref 4–11)

## 2020-02-24 PROCEDURE — 80048 BASIC METABOLIC PNL TOTAL CA: CPT | Performed by: PEDIATRICS

## 2020-02-24 PROCEDURE — 25800030 ZZH RX IP 258 OP 636: Performed by: PEDIATRICS

## 2020-02-24 PROCEDURE — 99238 HOSP IP/OBS DSCHRG MGMT 30/<: CPT | Performed by: FAMILY MEDICINE

## 2020-02-24 PROCEDURE — 25000132 ZZH RX MED GY IP 250 OP 250 PS 637: Performed by: PEDIATRICS

## 2020-02-24 PROCEDURE — 85048 AUTOMATED LEUKOCYTE COUNT: CPT | Performed by: PEDIATRICS

## 2020-02-24 PROCEDURE — 85004 AUTOMATED DIFF WBC COUNT: CPT | Performed by: PEDIATRICS

## 2020-02-24 PROCEDURE — 36415 COLL VENOUS BLD VENIPUNCTURE: CPT | Performed by: PEDIATRICS

## 2020-02-24 RX ORDER — ACETAMINOPHEN 500 MG
1000 TABLET ORAL EVERY 6 HOURS PRN
Status: DISCONTINUED | OUTPATIENT
Start: 2020-02-24 | End: 2020-02-24 | Stop reason: HOSPADM

## 2020-02-24 RX ORDER — SODIUM CHLORIDE, SODIUM LACTATE, POTASSIUM CHLORIDE, CALCIUM CHLORIDE 600; 310; 30; 20 MG/100ML; MG/100ML; MG/100ML; MG/100ML
1000 INJECTION, SOLUTION INTRAVENOUS CONTINUOUS
Status: DISCONTINUED | OUTPATIENT
Start: 2020-02-24 | End: 2020-02-24 | Stop reason: HOSPADM

## 2020-02-24 RX ADMIN — Medication 1 LOZENGE: at 10:57

## 2020-02-24 RX ADMIN — SODIUM CHLORIDE, POTASSIUM CHLORIDE, SODIUM LACTATE AND CALCIUM CHLORIDE 1000 ML: 600; 310; 30; 20 INJECTION, SOLUTION INTRAVENOUS at 06:36

## 2020-02-24 RX ADMIN — Medication 1 LOZENGE: at 18:35

## 2020-02-24 RX ADMIN — Medication 1 LOZENGE: at 06:39

## 2020-02-24 RX ADMIN — Medication 1 LOZENGE: at 13:02

## 2020-02-24 RX ADMIN — Medication 1 LOZENGE: at 16:07

## 2020-02-24 ASSESSMENT — ACTIVITIES OF DAILY LIVING (ADL)
ADLS_ACUITY_SCORE: 11

## 2020-02-24 ASSESSMENT — MIFFLIN-ST. JEOR: SCORE: 1774.58

## 2020-02-24 NOTE — PROGRESS NOTES
[facility]    Medicine Progress Note - Hospitalist Service       Date of Admission:  2/23/2020  Assessment & Plan    70-year-old man admitted yesterday with small bowel obstruction.  He is markedly improved with small bowel obstruction that appears to be resolving.  Cause remains unclear, but small bowel obstruction due to adhesions after previous abdominal surgeries seems most likely.  Leukocytosis has worsened and continues to have neutrophil predominance, but infection has not been suspected.  He does have CLL, but present leukocytosis appears to be mostly neutrophils.  His nonischemic cardiomyopathy has been stable while he has been receiving IV fluids without concern for acute heart failure so far.  Although musculoskeletal left-sided chest wall pain is suspected based on clinical findings at admission, no rib lesions have been identified radiographically on x-rays, so cause for his musculoskeletal left-sided chest wall pain remains indeterminant.    Principal Problem:    Small bowel obstruction (H)  Active Problems:    Chest wall pain    CLL (chronic lymphocytic leukemia) (H)-no specific therapy    Nonischemic cardiomyopathy (H)-EF 40-45% Nov 2019 Echo    Neutrophilic leukocytosis    Dyspnea on exertion    Personal history of malignant neoplasm of prostate-s/p surgery    Bilateral inguinal hernia-containing fat    Diastolic dysfunction grade I by Echo Nov 2019    Clamp NG tube this morning, anticipate starting clear liquids midday if he does not have worsening abdominal pain or nausea after clamping his NG  If he tolerates clear liquids through the day, anticipate advancing to full liquid diet late in day and potentially removing his NG tube this evening  Recommended follow-up with his PCP for further diagnostic evaluation of persistent left-sided chest wall pain probably of musculoskeletal origin    Diet: NPO for Medical/Clinical Reasons Except for: Ice Chips    DVT  Prophylaxis: Low Risk/Ambulatory with no VTE prophylaxis indicated  Lobato Catheter: not present  Code Status: Full Code      Disposition Plan   Expected discharge: Tomorrow, recommended to prior living arrangement once Tolerating adequate oral intake with resolved bowel obstruction.  Entered: Elmer Lofton MD 02/24/2020, 9:37 AM       The patient's care was discussed with the Patient and Patient's spouse.    Elmer Lofton MD  Hospitalist Service  [facility]    ______________________________________________________________________    Interval History   He is feeling much better today.  He has no abdominal pain and has not needed any medication for treatment of abdominal pain since yesterday.  He has no nausea and has not had any further vomiting since yesterday.  He is passing flatus.  He has been afebrile and hemodynamically stable.  Urine output has been adequate.  He had a large amount of NG output yesterday but only 275 mL overnight.  He noticed last night and this morning after waking that he had no left-sided chest pain.  However, after getting up for the day, he has now started to have increasing pain in his left upper chest similar to the pain that he has had for the last 3 months or so.    Data reviewed today: I reviewed all medications, new labs and imaging results over the last 24 hours. I personally reviewed his rib x-rays yesterday.    Physical Exam   Vital Signs: Temp: 98.9  F (37.2  C) Temp src: Oral BP: 130/78 Pulse: 98   Resp: 16 SpO2: 93 % O2 Device: None (Room air)    Weight: 207 lbs 0 oz  General Appearance: No distress sitting in a chair, NG tube present in the naris  Respiratory: Normal respiratory effort, clear lungs  Cardiovascular: Regular rate and rhythm  GI: Hypoactive bowel sounds but more bowel sounds today than yesterday, nondistended abdomen, soft, no abdominal tenderness     Data   Recent Labs   Lab 02/24/20  0606 02/23/20  0557   WBC 21.6* 15.9*    HGB  --  16.3   MCV  --  91   PLT  --  309    139   POTASSIUM 4.1 4.2   CHLORIDE 111* 103   CO2 27 30   BUN 25 20   CR 0.67 0.77   ANIONGAP 5 6   OLIVIA 8.2* 8.9   * 144*   ALBUMIN  --  3.8   PROTTOTAL  --  7.0   BILITOTAL  --  0.5   ALKPHOS  --  88   ALT  --  38   AST  --  22   LIPASE  --  70*   TROPI  --  <0.015     Left-sided rib x-rays performed yesterday did not demonstrate any focal bone lesions.    Medications     lactated ringers 1,000 mL (02/24/20 0636)       sodium chloride (PF)  3 mL Intracatheter Q8H

## 2020-02-24 NOTE — PROGRESS NOTES
S:  End of shift note    B:  Bowel obstruction    A:  NG clamped at 0900, pt denies nausea, pain.  BS + pt had a large soft BM this a.m.  Clear liquids started for lunch tolerated well.  C/O sore throat from NG tube, lozenges given x 2.  IVF @ 50 hr.  VSS, pt would like to be discharged this evening if at all possible.     R:  Continue with POC

## 2020-02-24 NOTE — PLAN OF CARE
"Alert and oriented, denied pain.  NPO except for ice chips, consuming small amount of ice chips during night, \"little nausea\" reported, antiemetics offered and declined, no nausea reported this morning, continue to monitor.  NG patent LIS, 225 ml yellow/light green output.  No BM this shift, + BS, + flatus per pt report.  Lozenge for comfort x 1.  Bed alarm on for safety, call light within reach.  / 87, 140/87, continue to monitor. Teresa Acuna RN on 2/24/2020 at 6:45 AM    "

## 2020-02-24 NOTE — PLAN OF CARE
Vitally stable. Pt denies having any pain or nausea. Asks for frequent lozenges and ice chips. Denies passing flatus. Visited with daughter this evening. Will continue to monitor according to care plan.

## 2020-02-24 NOTE — PROGRESS NOTES
SPIRITUAL HEALTH SERVICES  SPIRITUAL ASSESSMENT Progress Note  [facility]      During Rounding,  introduced himself to Sean Palmer and informed him of his availability.    Boni Holman M.Div., [facility]  Staff   Office tel: [phone]

## 2020-02-25 NOTE — DISCHARGE SUMMARY
[facility]  Hospitalist Discharge Summary       Date of Admission:  2/23/2020  Date of Discharge:  2/24/2020  Discharging Provider: Christos Rivera MD      Discharge Diagnoses   Principal Problem:    Small bowel obstruction (H)  Active Problems:    Chest wall pain    Personal history of malignant neoplasm of prostate-s/p surgery    CLL (chronic lymphocytic leukemia) (H)-no specific therapy    Bilateral inguinal hernia-containing fat    Nonischemic cardiomyopathy (H)-EF 40-45% Nov 2019 Echo    Neutrophilic leukocytosis    Dyspnea on exertion    Diastolic dysfunction grade I by Echo Nov 2019        Follow-ups Needed After Discharge   Follow-up Appointments     Follow-up and recommended labs and tests       Follow up with primary care provider, [facility], within   7 days for hospital follow- up.  No follow up labs or test are needed.             Unresulted Labs Ordered in the Past 30 Days of this Admission     No orders found for last 31 day(s).      These results will be followed up by [facility]    Discharge Disposition   Discharged to home  Condition at discharge: Good    Hospital Course    70-year-old man admitted with small bowel obstruction. Cause remains unclear, but small bowel obstruction due to adhesions after previous abdominal surgeries seems most likely.  Leukocytosis has worsened and continues to have neutrophil predominance, but infection has not been suspected.  He does have CLL, but present leukocytosis appears to be mostly neutrophils.  His nonischemic cardiomyopathy has been stable while he has been receiving IV fluids without concern for acute heart failure so far.  Although musculoskeletal left-sided chest wall pain is suspected based on clinical findings at admission, no rib lesions have been identified radiographically on x-rays, so cause for his musculoskeletal left-sided chest wall pain remains indeterminant.  Patient was treated  conservatively with n.p.o. status and NG decompression.  His abdominal pain and abdominal distention quickly resolved by the morning of discharge was feeling much better.  He was started on a clear liquid diet with his NG tube terminated later in the day and he tolerated a regular diet for discharge.  Patient will be discharged home.  Plan to follow-up with his primary care provider to continue work-up on potential causes for his left shoulder discomfort.       Consultations This Hospital Stay   None    Code Status   Full Code    Time Spent on this Encounter   I, Christos Rivera MD, personally saw the patient today and spent less than or equal to 30 minutes discharging this patient.       Christos Rivera MD  [facility]  ______________________________________________________________________    Physical Exam   Vital Signs: Temp: 96.1  F (35.6  C) Temp src: Oral BP: 139/81 Pulse: 85 Heart Rate: 85 Resp: 18 SpO2: 97 % O2 Device: None (Room air)    Weight: 207 lbs 0 oz  Constitutional: awake, alert, cooperative, no apparent distress, and appears stated age  Respiratory: No increased work of breathing, good air exchange, clear to auscultation bilaterally, no crackles or wheezing  Cardiovascular: Normal apical impulse, regular rate and rhythm, normal S1 and S2, no S3 or S4, and no murmur noted  GI: normal bowel sounds, soft, non-distended and non-tender       Primary Care Physician   [facility]    Discharge Orders      Reason for your hospital stay    Admitted with increased abdominal determined to be caused from a bowel obstruction     Follow-up and recommended labs and tests     Follow up with primary care provider, [facility], within 7 days for hospital follow- up.  No follow up labs or test are needed.     Activity    Your activity upon discharge: activity as tolerated     Full Code     Diet    Follow this diet upon discharge: Orders Placed  This Encounter      Advance Diet as Tolerated: Clear Liquid Diet, tolerating regular diet at discharge       Significant Results and Procedures   Most Recent 3 CBC's:  Recent Labs   Lab Test 02/24/20  0606 02/23/20  0557 09/18/15  1158   WBC 21.6* 15.9* 16.7*   HGB  --  16.3 16.4   MCV  --  91 89   PLT  --  309 252     Most Recent 3 BMP's:  Recent Labs   Lab Test 02/24/20  0606 02/23/20  0557 08/31/17  0933    139 139   POTASSIUM 4.1 4.2 4.4   CHLORIDE 111* 103 105   CO2 27 30 28   BUN 25 20 19   CR 0.67 0.77 0.84   ANIONGAP 5 6 6   OLIVIA 8.2* 8.9 8.6   * 144* 96   ,   Results for orders placed or performed during the hospital encounter of 02/23/20   CT Abdomen Pelvis w/o Contrast    Narrative    EXAM: CT ABDOMEN AND PELVIS WITHOUT CONTRAST  LOCATION: [facility]  DATE/TIME: 02/23/2020, 6:40 AM    INDICATION: Diffuse abdominal pain. Abdominal distention. No flatus.  COMPARISON: None.    TECHNIQUE: CT scan of the abdomen and pelvis was performed without IV contrast. Multiplanar reformats were obtained. Dose reduction techniques were used.  CONTRAST: 10/06/2010.    FINDINGS:    LOWER CHEST: Tiny calcified granuloma in the left lung base.    HEPATOBILIARY: The gallbladder is mildly distended, but otherwise unremarkable.    SPLEEN: Unremarkable.    PANCREAS: Unremarkable.    ADRENAL GLANDS: Unremarkable.    KIDNEYS/BLADDER: No suspicious renal lesions.    BOWEL: The stomach is moderately distended with fluid. There is a moderately long segment of mildly dilated fluid-filled proximal jejunum. The more distal small bowel and colon are relatively decompressed. The appendix is not visualized.    LYMPH NODES: Unremarkable.    VASCULATURE: Atherosclerotic calcification in the abdominal aorta.    OTHER: Small to moderate-sized right inguinal hernia containing fat and tiny left inguinal hernia containing fat.      Impression    IMPRESSION: Proximal small bowel obstruction: The stomach and proximal  jejunum are fluid-filled and mildly dilated and the more distal bowel is relatively decompressed.      XR Chest Port 1 View    Narrative    CHEST PORTABLE ONE VIEW 2/23/2020 7:50 AM     HISTORY: NG placement.     COMPARISON: September 9, 2010 chest radiograph.    FINDINGS: Normal heart and lungs. Slight tortuosity and ectasia of the  thoracic aorta redemonstrated. NG tube is present with a portion of  the tube overlapping the medial stomach. The tip is not included on  this radiograph.      Impression    IMPRESSION: No acute cardiopulmonary abnormalities.     ASHELY CARDOZO MD   X-ray lt Rib unilat 3 vws + PA chest    Narrative    EXAM: XR RIBS and CHEST LT 3VW  LOCATION: [facility]  DATE/TIME: 2/23/2020 12:37 PM    INDICATION: Left chest pain and focal tenderness in the axillary region.  COMPARISON: 02/23/2020 CT abdomen pelvis.      Impression    IMPRESSION: No rib fractures or rib lesions. Enteric tube with tip extending into the stomach. Normal heart size and pulmonary vascularity. No pleural effusions are evident.       Discharge Medications   Current Discharge Medication List      CONTINUE these medications which have NOT CHANGED    Details   acetaminophen (TYLENOL) 325 MG tablet Take 325-650 mg by mouth every 6 hours as needed for mild pain Took 2 650 yesterday.      ascorbic acid 500 MG TABS Take 1,000 mg by mouth      cholecalciferol (VITAMIN D-1000 MAX ST) 25 MCG (1000 UT) TABS Take 1,000 Units by mouth      cyclobenzaprine (FLEXERIL) 10 MG tablet Take 10 mg by mouth      gabapentin (NEURONTIN) 100 MG capsule Take 100 mg by mouth      ibuprofen (ADVIL,MOTRIN) 200 MG tablet take 3 Tabs by mouth 4 times daily as needed for pain.    Associated Diagnoses: Rib fracture; Fracture of transverse process of lumbar vertebra (H)      polyethylene glycol (MIRALAX) packet Take 17 g by mouth      Omega-3 Fatty Acids (ULTRA OMEGA 3) 1000 MG CAPS Take 1,000 mg by mouth           Allergies   Allergies    Allergen Reactions     No Known Drug Allergies

## 2020-02-25 NOTE — PLAN OF CARE
S-(situation): Patient discharged to home via ambulatory with wife @ 1849.    B-(background): Small Bowel Obstruction    A-(assessment): VSS. Pt denies pain and nausea. Soft BM today. Active bowel sounds in all quadrants.Tolerating full liquid diet. IV removed. NG pulled without complication. Lozenge given x2 for sore thraot from NG tube. Discharge instructions given.    R-(recommendations): Discharge instructions reviewed with patient and wife. Listed belongings gathered and returned to patient.          Discharge Nursing Criteria:     Care Plan and Patient education resolved: Yes    New Medications- pt has been educated about purpose and side effects: Not Applicable    Vaccines  Influenza status verified at discharge:  Yes    MISC  Prescriptions if needed, hard copies sent with patient  NA, no new meds.  Home and hospital aquired medications returned to patient: NA, no home meds.  Medication Bin checked and emptied on discharge Yes  Patient reports post-discharge pain management plan is effective: Yes

## 2020-03-01 NOTE — PROGRESS NOTES
"Sean Palmer  Gender: male  : 1950  [address]  [facility] [id number]  [phone] (home) [phone] (work)    Medical Record: [id number]  Pharmacy: [facility]  [place] - [address]   Primary Care Provider: [facility]    Parent's names are: Data Unavailable (mother) and Data Unavailable (father).      [facility]  2020     Discharge Phone Call:  Key Words/Key Times      Introduction - AIDET (Acknowledge, Introduce, Duration, Explanation)      Empathy-   We are calling to see how you are since your recent stay in the hospital?     Call back COMMENTS: I am doing well      Clinical Questions -  (f/u appts, medication side effects/purpose, ability to care for self at home) \"For your safety, it is important to us that you understand the purpose and side effects of your medications, can you tell me what your new medications are?\"     Call back COMMENTS: I do not have any questions. [facility]      Staff Recognition -  We like to recognize staff and physicians who have done an excellent job.  Do you remember any people from your care team that you would like recognize?     Call back COMMENTS: I cannot remember names but everyone was good      Very Good Care -  We want to provide very good care to all patients.  How was your care?     Call back COMMENTS: my care was very good      Opportunities for Improvement -  Our goal is to be the best.  Do you have any suggestions for things that we could improve upon?     Call back COMMENTS: no      Thank You           "

## 2020-03-10 ENCOUNTER — TRANSFERRED RECORDS (OUTPATIENT)
Dept: HEALTH INFORMATION MANAGEMENT | Facility: CLINIC | Age: 70
End: 2020-03-10

## 2020-04-29 ENCOUNTER — NURSE TRIAGE (OUTPATIENT)
Dept: NURSING | Facility: CLINIC | Age: 70
End: 2020-04-29

## 2020-04-29 NOTE — TELEPHONE ENCOUNTER
"Caller from  [facility] wanting to  \"put patient on list\" for a  Pulmonology referral after the covid pandemic is over\"; states that initial referral they made was  declined for this reason   Advised caller that  FNA is unable to  perform this task and to call  Clinic in morning   Caller understands and will comply   Tootie Wayne RN  FNA    Reason for Disposition    [1] Caller requesting NON-URGENT health information AND [2] PCP's office is the best resource    Protocols used: INFORMATION ONLY CALL-A-AH      "

## 2020-05-05 ENCOUNTER — HOSPITAL ENCOUNTER (OUTPATIENT)
Dept: CARDIOLOGY | Facility: CLINIC | Age: 70
Discharge: HOME | End: 2020-05-05
Admitting: FAMILY MEDICINE
Payer: COMMERCIAL

## 2020-05-05 DIAGNOSIS — I42.9 SECONDARY CARDIOMYOPATHY (H): ICD-10-CM

## 2020-05-05 DIAGNOSIS — I42.9 SECONDARY CARDIOMYOPATHY (H): Primary | ICD-10-CM

## 2020-05-05 PROCEDURE — 25500064 ZZH RX 255 OP 636: Performed by: INTERNAL MEDICINE

## 2020-05-05 PROCEDURE — 93306 TTE W/DOPPLER COMPLETE: CPT

## 2020-05-05 PROCEDURE — 93306 TTE W/DOPPLER COMPLETE: CPT | Mod: 26 | Performed by: INTERNAL MEDICINE

## 2020-05-05 RX ADMIN — HUMAN ALBUMIN MICROSPHERES AND PERFLUTREN 5 ML: 10; .22 INJECTION, SOLUTION INTRAVENOUS at 09:38

## 2020-07-17 ENCOUNTER — TRANSFERRED RECORDS (OUTPATIENT)
Dept: HEALTH INFORMATION MANAGEMENT | Facility: CLINIC | Age: 70
End: 2020-07-17

## 2020-08-07 ENCOUNTER — OFFICE VISIT (OUTPATIENT)
Dept: PULMONOLOGY | Facility: CLINIC | Age: 70
End: 2020-08-07
Payer: COMMERCIAL

## 2020-08-07 VITALS
HEIGHT: 75 IN | SYSTOLIC BLOOD PRESSURE: 110 MMHG | WEIGHT: 207.6 LBS | RESPIRATION RATE: 24 BRPM | HEART RATE: 80 BPM | DIASTOLIC BLOOD PRESSURE: 78 MMHG | BODY MASS INDEX: 25.81 KG/M2 | OXYGEN SATURATION: 98 %

## 2020-08-07 DIAGNOSIS — R06.00 DYSPNEA, UNSPECIFIED TYPE: Primary | ICD-10-CM

## 2020-08-07 PROCEDURE — 99204 OFFICE O/P NEW MOD 45 MIN: CPT | Performed by: INTERNAL MEDICINE

## 2020-08-07 ASSESSMENT — MIFFLIN-ST. JEOR: SCORE: 1777.3

## 2020-08-07 NOTE — NURSING NOTE
Does Sean have home oxygen?  Yes patient purchased his own concentrator and uses it occasionally.

## 2020-08-07 NOTE — NURSING NOTE
Office visit notes from today's visit faxed to [facility].  Fax# [phone]      Devi JAMES CMA

## 2020-08-07 NOTE — PROGRESS NOTES
Taqueria Palmer is a 70-year-old male who is referred by Tatianna Kulkarni, nurse practitioner at the [facility], for exertional shortness of breath.  I did review as part of this visit photocopies of pulmonary function test and [facility] as well as other information in the [facility].    Patient states he had a very good exercise capacity and no prior lung disease diagnosis until last fall when he developed a severe persistent non-exertional related pain over his left chest area.  Although this was not exertional related there was concern for cardiac etiology and he was seen by [facility]ologist at [facility] and the [facility].  He was told and, this is reflected in the medical records, he has a mild cardiomyopathy, last ejection fraction was 35 to 40% with normal right ventricular function. n 2018 he had undergone a left and right heart catheterization which is reviewed below but essentially showing mild exercise-induced pulmonary hypertension.  He was placed on a variety of cardiac medications but this did nothing for him and he  stopped them.  The chest pain was felt to be noncardiac and was persistent and was the most distressing part of his symptomatology but fortunately he saw  pain specialist who placed him on Cymbalta and his chest pain resolved within 2 weeks.  He is now been weaned off the Cymbalta and the chest pain is still not present.    He describes his shortness of breath as being limited in taking a full breath but also not able to do as much yard work as he did last year.  He is describing  weeding the garden or mowing the lawn or doing light housework, he still can do it but it seems to take longer and he has to sit down and rest more often and often feels generalized weakness not focal weakness until he recovers.  However, and this is a important point, he has excellent exercise capacity for other activities, for instance he can bicycle 2 to 4 miles [place]  [place] without any problems.  He is not short of breath at rest.  The chest pain has resolved.  He has no cough, no wheeze.  No ear or throat symptoms.  No dysphasia or aspiration events.  He eats a regular diet.  Weight has been stable.  No significant allergic symptoms.He does have a home oximeter that he checks and with significant activity he will go as low as 88% but then this recovers quickly.  Most of his oxygen levels are in the mid 90s.    He was never told of a prior lung disease diagnosis.  He states couple years ago after hospitalization he was given an albuterol inhaler which he took for a couple days and then stopped it.     He also has  CLL but has received no treatment for this and his last white cell count has been normal range.  He also had prostate cancer but a prostatectomy done several years ago seems to have cured that.  No problems sleeping.  No nocturnal awakening.  He lives in a clean house without any mold or water damage.  He says sometimes after he stands up from a squatting position he will feel lightheaded but has never had true syncope.  He does not receive the yearly flu shot and has never had a pneumonia vaccination.      SH:  He is retired  and  without exposure to toxic inhalants.    Family history.  His mother had pulmonary fibrosis and was on hospice for that although she eventually ended up dying of complications from hip fracture.        Past Medical History:   Diagnosis Date     Depressive disorder, not elsewhere classified      Other and unspecified disc disorder of unspecified region      Prostatitis, unspecified      Current Outpatient Medications   Medication Sig Dispense Refill     acetaminophen (TYLENOL) 325 MG tablet Take 325-650 mg by mouth every 6 hours as needed for mild pain Took 2 650 yesterday.       ascorbic acid 500 MG TABS Take 1,000 mg by mouth daily        cholecalciferol (VITAMIN D-1000 MAX ST) 25 MCG (1000 UT) TABS Take  1,000 Units by mouth daily        cyclobenzaprine (FLEXERIL) 10 MG tablet Take 10 mg by mouth 3 times daily as needed        gabapentin (NEURONTIN) 100 MG capsule Take 100 mg by mouth 3 times daily as needed        ibuprofen (ADVIL,MOTRIN) 200 MG tablet take 3 Tabs by mouth 4 times daily as needed for pain.       Omega-3 Fatty Acids (ULTRA OMEGA 3) 1000 MG CAPS Take 1,000 mg by mouth daily        polyethylene glycol (MIRALAX) packet Take 17 g by mouth daily as needed        Family History   Problem Relation Age of Onset     Heart Disease Father         Father  of MI.     Hypertension Mother      Heart Disease Mother         Angina pectoris.     Thyroid Disease Mother         Goiter     Respiratory Mother      Cerebrovascular Disease Sister      Hypertension Sister      C.A.D. Brother      Social History     Socioeconomic History     Marital status:      Spouse name: Not on file     Number of children: Not on file     Years of education: Not on file     Highest education level: Not on file   Occupational History     Not on file   Social Needs     Financial resource strain: Not on file     Food insecurity     Worry: Not on file     Inability: Not on file     Transportation needs     Medical: Not on file     Non-medical: Not on file   Tobacco Use     Smoking status: Never Smoker   Substance and Sexual Activity     Alcohol use: No     Drug use: No     Sexual activity: Never   Lifestyle     Physical activity     Days per week: Not on file     Minutes per session: Not on file     Stress: Not on file   Relationships     Social connections     Talks on phone: Not on file     Gets together: Not on file     Attends Bahai service: Not on file     Active member of club or organization: Not on file     Attends meetings of clubs or organizations: Not on file     Relationship status: Not on file     Intimate partner violence     Fear of current or ex partner: Not on file     Emotionally abused: Not on file      "Physically abused: Not on file     Forced sexual activity: Not on file   Other Topics Concern      Service Yes     Comment: [employer]     Blood Transfusions No     Caffeine Concern No     Occupational Exposure No     Hobby Hazards No     Sleep Concern No     Stress Concern No     Weight Concern No     Special Diet No     Back Care Yes     Comment: Chiropractor once a month.     Exercise Yes     Comment: bike,[email or web address]     Bike Helmet No     Seat Belt Yes     Self-Exams Yes     Parent/sibling w/ CABG, MI or angioplasty before 65F 55M? Not Asked   Social History Narrative     Not on file     Constitutional: NEGATIVE, fatigue, fever and weight loss  Eyes: NEGATIVE for vision changes or irritation and redness.  ENT/Mouth: NEGATIVE for hoarseness and sore throat  CV: resolved  chest pain, w/o palpitations or chest pressure, lower extremity edema and syncope but feels briefly lightheaded upon standing.   Respiratory:  NEGATIVE for significant cough or shortness of breath at rest, hemoptysis, excessive sleepiness  GI: NEGATIVE for nausea, abdominal pain, heartburn, or change in bowel habits  Musculoskeletal: no arthralgias or joint swelling  Integumentary/Skin: NEGATIVE for rash  Neurological:  NEGATIVE for numbness or tingling and focal weakness  Hemotologic/Lymphatic: NEGATIVE for bleeding disorder and swollen nodes  Allergic/Immunologic: No rhinitis or hives  RESPIRATORY EXAM:  Ht 1.905 m (6' 3\")   Wt 94.2 kg (207 lb 9.6 oz)   BMI 25.95 kg/m    Patient is well-nourished and well-appearing   Moves easily to exam table without dyspnea, voice quality normal  Nares have no obstruction or d/c and normal mucosa.  No elizabeth-pharyngeal lesions.    No neck masses or thyromegaly or jugular venous distention   Symmetrical chest, normal configuration, without accessory muscle use or tenderness on palpation. Clear breath sounds. No stridor.   Normal S1 and S2 heart sounds without murmur rub or gallop. No limb edema.  No abdominal " distension  No neck or supraclavicular adenopathy.   No muscle atrophy. 5/5 lower limb strength bilaterally.  No tremor.  No digit clubbing. Mild OA changes in fingers   No skin rash.   Affect is normal; cognition is normal.     Feb BMP normal, CBC  Hgb 16 and absolute lymph count normal   Echo in May 2020--EF 35-40% but normal RV function and size      2018  [facility] cardiology  and [facility]:  HEMODYNAMIC DATA: Baseline aortic pressure was 120/77, mean of 97, right atrial pressure 1, right ventricular pressure is 17/3. Pulmonary artery pressure is 16/3 with a mean of 7. Pulmonary capillary wedge pressure is 2. Britney cardiac output is 4.3. Cardiac index is 1.9. PA sat is 70%. Oxygen saturation is 98%. Systemic vascular resistance is 1,786 dynes/sec/cm2 and pulmonary vascular resistance 130 dynes/sec/cm2.     Post saline flush right atrial pressure is 4 mmHg, pulmonary capillary wedge pressure is 8 mmHg. Pulmonary artery pressure is 30/15 with a mean of 19 mmHg. Pulmonary capillary wedge pressure is 8 mmHg. Aortic pressure if 145/93 with a mean of 112. Pulmonary sat is 73%. Oxygen sat is 97%. Britney cardiac output is 5.3. Cardiac index is 2.3.     Post exercise hemodynamics include right atrial pressure of 3 mmHg. Pulmonary artery pressure is 42/14 and mean of 33 mmHg. Pulmonary capillary wedge pressure is 13 mmHg. Aortic pressure is 153/88 and mean of 111. Pulmonary artery sat is 66%. Oxygen sat is 99%. Britney cardiac output is 3.9. Cardiac index is 1.8. Systemic vascular resistance if 2,215 dynes/sec/cm2. Pulmonary vascular resistance is 615 dynes/sec/cm2 (7.23 wood units).     CONCLUSION:  1) Baseline intravascular volume depletion.     2) Normal hemodynamics status post saline infusion.     3) Increased pulmonary vascular resistance and the development of moderate pulmonary hypertension post exercise in the setting of normal left and right ventricular filling pressure.     PFTs: Pulmonary function tests obtained on July  17, 2020 were reviewed with the patient.  Vital capacity 5.11 L, 107% of predicted.  FEV1 3.44 L, 96% of predicted with a ratio 67%.  Diffusion capacity minimally reduced at 67% of predicted.  He also had 2 prior pulmonary function test done in 2016 and 2017 all of which showed normal FVC and FEV1 in fact his vital capacity at 5.11 is the best that he is ever had.  Although his ratio is slightly low in the last test they were 72 and 75% previously.  My interpretation is normal pulmonary function test with minimally reduced diffusion capacity.  Patient's CT scan was reviewed showing no lymphadenopathy no pulmonary fibrosis or emphysema.  Minimal nonsuspicious pulmonary nodules.    Assessment: Exertional dyspnea however his work-up is close to normal.  Although he has minimal exercise-induced pulmonary hypertension on a heart cath in 2018 a recent echocardiogram shows normal RV function therefore he does not have significant pulmonary hypertension.  And I tend to estimate a patient's functional activity by the best activity they can do, not the worst, and his ability to ride a bicycle for 2 to 4 miles at a time shows excellent cardiopulmonary function regardless of what the invasive evaluations show.  He does not have obstructive lung disease and therefore inhalers are not necessary.  I told him brief desaturations to the high 80s with activity are not dangerous and he would not benefit from oxygen therapy.  I told him it is unlikely he will have any worsening of his dyspnea and pulmonary hypertension does not need further evaluation as long as his best level of activity remains stable.  He seemed satisfied that he does not have a serious lung condition such as his mother did and he can return to me as needed. He should be receiving flu and pneumococcal vaccinations based on his age.      Ridge Nolan MD, MPH  Associate Professor of Medicine

## 2022-04-04 DIAGNOSIS — I48.91 ATRIAL FIBRILLATION, UNSPECIFIED TYPE (H): ICD-10-CM

## 2022-04-04 DIAGNOSIS — R06.09 DYSPNEA ON EXERTION: ICD-10-CM

## 2022-04-04 DIAGNOSIS — R07.2 PRECORDIAL PAIN: Primary | ICD-10-CM

## 2022-04-07 ENCOUNTER — HOSPITAL ENCOUNTER (OUTPATIENT)
Dept: CARDIOLOGY | Facility: CLINIC | Age: 72
Setting detail: NUCLEAR MEDICINE
Discharge: HOME | End: 2022-04-07
Payer: COMMERCIAL

## 2022-04-07 ENCOUNTER — HOSPITAL ENCOUNTER (OUTPATIENT)
Dept: NUCLEAR MEDICINE | Facility: CLINIC | Age: 72
Setting detail: NUCLEAR MEDICINE
Discharge: HOME | End: 2022-04-07
Payer: COMMERCIAL

## 2022-04-07 DIAGNOSIS — I48.91 ATRIAL FIBRILLATION, UNSPECIFIED TYPE (H): ICD-10-CM

## 2022-04-07 DIAGNOSIS — R07.2 PRECORDIAL PAIN: ICD-10-CM

## 2022-04-07 DIAGNOSIS — R06.09 DYSPNEA ON EXERTION: ICD-10-CM

## 2022-04-07 LAB
CV STRESS MAX HR HE: 129
NUC STRESS EJECTION FRACTION: 53 %
RATE PRESSURE PRODUCT: NORMAL
STRESS ECHO BASELINE DIASTOLIC HE: 78
STRESS ECHO BASELINE HR: 71 BPM
STRESS ECHO BASELINE SYSTOLIC BP: 138
STRESS ECHO CALCULATED PERCENT HR: 88 %
STRESS ECHO LAST STRESS DIASTOLIC BP: 78
STRESS ECHO LAST STRESS SYSTOLIC BP: 162
STRESS ECHO TARGET HR: 146

## 2022-04-07 PROCEDURE — 93017 CV STRESS TEST TRACING ONLY: CPT | Performed by: REHABILITATION PRACTITIONER

## 2022-04-07 PROCEDURE — 78452 HT MUSCLE IMAGE SPECT MULT: CPT

## 2022-04-07 PROCEDURE — 343N000001 HC RX 343

## 2022-04-07 PROCEDURE — 93017 CV STRESS TEST TRACING ONLY: CPT

## 2022-04-07 PROCEDURE — A9502 TC99M TETROFOSMIN: HCPCS

## 2022-04-07 PROCEDURE — 250N000011 HC RX IP 250 OP 636

## 2022-04-07 PROCEDURE — 93016 CV STRESS TEST SUPVJ ONLY: CPT | Performed by: INTERNAL MEDICINE

## 2022-04-07 PROCEDURE — 78452 HT MUSCLE IMAGE SPECT MULT: CPT | Mod: 26 | Performed by: INTERNAL MEDICINE

## 2022-04-07 PROCEDURE — 93018 CV STRESS TEST I&R ONLY: CPT | Performed by: INTERNAL MEDICINE

## 2022-04-07 RX ORDER — REGADENOSON 0.08 MG/ML
0.4 INJECTION, SOLUTION INTRAVENOUS ONCE
Status: COMPLETED | OUTPATIENT
Start: 2022-04-07 | End: 2022-04-07

## 2022-04-07 RX ADMIN — REGADENOSON 0.4 MG: 0.08 INJECTION, SOLUTION INTRAVENOUS at 09:56

## 2022-04-07 RX ADMIN — TETROFOSMIN 31.1 MCI.: 1.38 INJECTION, POWDER, LYOPHILIZED, FOR SOLUTION INTRAVENOUS at 10:15

## 2022-04-07 RX ADMIN — TETROFOSMIN 10.4 MCI.: 1.38 INJECTION, POWDER, LYOPHILIZED, FOR SOLUTION INTRAVENOUS at 08:40
